# Patient Record
Sex: MALE | URBAN - METROPOLITAN AREA
[De-identification: names, ages, dates, MRNs, and addresses within clinical notes are randomized per-mention and may not be internally consistent; named-entity substitution may affect disease eponyms.]

---

## 2023-02-27 PROBLEM — Z00.00 ENCOUNTER FOR PREVENTIVE HEALTH EXAMINATION: Status: ACTIVE | Noted: 2023-02-27

## 2023-08-30 ENCOUNTER — OFFICE (OUTPATIENT)
Dept: URBAN - METROPOLITAN AREA CLINIC 97 | Facility: CLINIC | Age: 78
Setting detail: OPHTHALMOLOGY
End: 2023-08-30
Payer: MEDICARE

## 2023-08-30 DIAGNOSIS — H43.813: ICD-10-CM

## 2023-08-30 DIAGNOSIS — H01.001: ICD-10-CM

## 2023-08-30 DIAGNOSIS — H25.13: ICD-10-CM

## 2023-08-30 DIAGNOSIS — G45.9: ICD-10-CM

## 2023-08-30 DIAGNOSIS — G45.3: ICD-10-CM

## 2023-08-30 DIAGNOSIS — H01.004: ICD-10-CM

## 2023-08-30 DIAGNOSIS — H35.033: ICD-10-CM

## 2023-08-30 PROCEDURE — 99214 OFFICE O/P EST MOD 30 MIN: CPT | Performed by: OPHTHALMOLOGY

## 2023-08-30 ASSESSMENT — LID EXAM ASSESSMENTS
OS_BLEPHARITIS: LUL 2+
OD_BLEPHARITIS: RUL 2+

## 2023-08-30 ASSESSMENT — REFRACTION_MANIFEST
OS_VA1: 20/30
OD_SPHERE: -3.75
OU_VA: 20/25-2
OS_SPHERE: -4.25
OS_VA2: 20/25(J1)
OD_CYLINDER: +1.50
OS_AXIS: 180
OD_AXIS: 010
OD_VA2: 20/25(J1)
OS_CYLINDER: +1.25
OD_VA1: 20/20-

## 2023-08-30 ASSESSMENT — REFRACTION_CURRENTRX
OD_OVR_VA: 20/
OS_VPRISM_DIRECTION: SV
OD_AXIS: 007
OD_SPHERE: -3.50
OS_CYLINDER: +1.25
OD_CYLINDER: +1.50
OD_VPRISM_DIRECTION: SV
OS_SPHERE: -4.25
OS_OVR_VA: 20/
OS_AXIS: 171

## 2023-08-30 ASSESSMENT — VISUAL ACUITY
OS_BCVA: 20/20-1
OD_BCVA: 20/20

## 2023-08-30 ASSESSMENT — SPHEQUIV_DERIVED
OS_SPHEQUIV: -3.75
OD_SPHEQUIV: -2.75
OS_SPHEQUIV: -3.625
OD_SPHEQUIV: -3

## 2023-08-30 ASSESSMENT — KERATOMETRY
OS_K1POWER_DIOPTERS: 45.25
OS_K2POWER_DIOPTERS: 45.50
METHOD_AUTO_MANUAL: AUTO
OD_AXISANGLE_DEGREES: 176
OD_K1POWER_DIOPTERS: 44.25
OD_K2POWER_DIOPTERS: 45.25
OS_AXISANGLE_DEGREES: 008

## 2023-08-30 ASSESSMENT — CONFRONTATIONAL VISUAL FIELD TEST (CVF)
OD_FINDINGS: FULL
OS_FINDINGS: FULL

## 2023-08-30 ASSESSMENT — AXIALLENGTH_DERIVED
OS_AL: 24.3904
OS_AL: 24.3384
OD_AL: 24.2205
OD_AL: 24.3236

## 2023-08-30 ASSESSMENT — REFRACTION_AUTOREFRACTION
OD_AXIS: 010
OS_AXIS: 177
OS_SPHERE: -4.50
OD_CYLINDER: +1.50
OD_SPHERE: -3.50
OS_CYLINDER: +1.50

## 2023-08-30 ASSESSMENT — TONOMETRY
OD_IOP_MMHG: 15
OS_IOP_MMHG: 14

## 2024-08-26 ENCOUNTER — APPOINTMENT (OUTPATIENT)
Dept: ORTHOPEDIC SURGERY | Facility: CLINIC | Age: 79
End: 2024-08-26
Payer: MEDICARE

## 2024-08-26 ENCOUNTER — TRANSCRIPTION ENCOUNTER (OUTPATIENT)
Age: 79
End: 2024-08-26

## 2024-08-26 DIAGNOSIS — Z78.9 OTHER SPECIFIED HEALTH STATUS: ICD-10-CM

## 2024-08-26 PROCEDURE — 73030 X-RAY EXAM OF SHOULDER: CPT | Mod: RT

## 2024-08-26 PROCEDURE — 99204 OFFICE O/P NEW MOD 45 MIN: CPT

## 2024-08-26 RX ORDER — AMLODIPINE BESYLATE 5 MG/1
TABLET ORAL
Refills: 0 | Status: ACTIVE | COMMUNITY

## 2024-08-26 RX ORDER — LOSARTAN POTASSIUM 100 MG/1
TABLET, FILM COATED ORAL
Refills: 0 | Status: ACTIVE | COMMUNITY

## 2024-08-28 NOTE — PHYSICAL EXAM
[Normal Coordination] : normal coordination [Normal Sensation] : normal sensation [Normal Mood and Affect] : normal mood and affect [Oriented] : oriented [Able to Communicate] : able to communicate [Well Developed] : well developed [Well Nourished] : well nourished [NL (0-70)] : full internal rotation 0-70 degrees [NL (0-90)] : full external rotation 0-90 degrees [4 ___] : forward flexion 4[unfilled]/5 [4___] : abduction 4[unfilled]/5 [5___] : internal rotation 5[unfilled]/5 [Right] : right shoulder [] : motor and sensory intact distally [There are no fractures, subluxations or dislocations. No significant abnormalities are seen] : There are no fractures, subluxations or dislocations. No significant abnormalities are seen [[id number]] : active forward flexion 125 degrees [de-identified] : active abduction 125 degrees

## 2024-08-28 NOTE — DISCUSSION/SUMMARY
[de-identified] : We discussed MRI as patient is concerned for tear. We did discuss that likelihood is more bursitis/impingement due to more acute nature without injury. We discussed formal PT, a home exercise program, ice therapy and the role of NSAIDS for the pain. These modalities will improve the patient's functionality in their activities of daily life.  Risks, benefits and contraindications were discussed.  The patient will follow up after MRI or sooner as needed.

## 2024-08-28 NOTE — PHYSICAL EXAM
[Normal Coordination] : normal coordination [Normal Sensation] : normal sensation [Normal Mood and Affect] : normal mood and affect [Oriented] : oriented [Able to Communicate] : able to communicate [Well Developed] : well developed [Well Nourished] : well nourished [NL (0-70)] : full internal rotation 0-70 degrees [NL (0-90)] : full external rotation 0-90 degrees [4 ___] : forward flexion 4[unfilled]/5 [4___] : abduction 4[unfilled]/5 [5___] : internal rotation 5[unfilled]/5 [] : motor and sensory intact distally [Right] : right shoulder [There are no fractures, subluxations or dislocations. No significant abnormalities are seen] : There are no fractures, subluxations or dislocations. No significant abnormalities are seen [[id number]] : active forward flexion 125 degrees [de-identified] : active abduction 125 degrees

## 2024-08-28 NOTE — DISCUSSION/SUMMARY
[de-identified] : We discussed MRI as patient is concerned for tear. We did discuss that likelihood is more bursitis/impingement due to more acute nature without injury. We discussed formal PT, a home exercise program, ice therapy and the role of NSAIDS for the pain. These modalities will improve the patient's functionality in their activities of daily life.  Risks, benefits and contraindications were discussed.  The patient will follow up after MRI or sooner as needed.

## 2024-08-29 ENCOUNTER — APPOINTMENT (OUTPATIENT)
Dept: MRI IMAGING | Facility: CLINIC | Age: 79
End: 2024-08-29

## 2024-08-29 ENCOUNTER — RESULT REVIEW (OUTPATIENT)
Age: 79
End: 2024-08-29

## 2024-08-29 PROCEDURE — 73221 MRI JOINT UPR EXTREM W/O DYE: CPT | Mod: RT,MH

## 2024-08-29 PROCEDURE — 72148 MRI LUMBAR SPINE W/O DYE: CPT | Mod: MH

## 2024-09-05 ENCOUNTER — APPOINTMENT (OUTPATIENT)
Dept: ORTHOPEDIC SURGERY | Facility: CLINIC | Age: 79
End: 2024-09-05
Payer: MEDICARE

## 2024-09-05 DIAGNOSIS — M75.51 BURSITIS OF RIGHT SHOULDER: ICD-10-CM

## 2024-09-05 DIAGNOSIS — M75.41 IMPINGEMENT SYNDROME OF RIGHT SHOULDER: ICD-10-CM

## 2024-09-05 PROCEDURE — 99214 OFFICE O/P EST MOD 30 MIN: CPT

## 2024-09-05 NOTE — PHYSICAL EXAM
[Normal Coordination] : normal coordination [Normal Sensation] : normal sensation [Normal Mood and Affect] : normal mood and affect [Oriented] : oriented [Able to Communicate] : able to communicate [Well Developed] : well developed [Well Nourished] : well nourished [NL (0-70)] : full internal rotation 0-70 degrees [NL (0-90)] : full external rotation 0-90 degrees [4 ___] : forward flexion 4[unfilled]/5 [4___] : abduction 4[unfilled]/5 [5___] : internal rotation 5[unfilled]/5 [Right] : right shoulder [There are no fractures, subluxations or dislocations. No significant abnormalities are seen] : There are no fractures, subluxations or dislocations. No significant abnormalities are seen [] : negative Asa [[email or web address]] : active forward flexion 125 degrees [de-identified] : active abduction 125 degrees

## 2024-09-05 NOTE — DISCUSSION/SUMMARY
[de-identified] : We discussed formal PT, a home exercise program, ice therapy and the role of NSAIDS for the pain. These modalities will improve the patient's functionality in their activities of daily life.  Risks, benefits and contraindications were discussed.  The patient will follow up in 6 weeks or sooner as needed.

## 2024-09-05 NOTE — DISCUSSION/SUMMARY
[de-identified] : We discussed formal PT, a home exercise program, ice therapy and the role of NSAIDS for the pain. These modalities will improve the patient's functionality in their activities of daily life.  Risks, benefits and contraindications were discussed.  The patient will follow up in 6 weeks or sooner as needed.

## 2024-09-05 NOTE — DATA REVIEWED
[MRI] : MRI [Right] : of the right [Shoulder] : shoulder [Report was reviewed and noted in the chart] : The report was reviewed and noted in the chart [I reviewed the films/CD] : I reviewed the films/CD [FreeTextEntry1] : 1.  Low-grade bursal and interstitial tearing of the anterior fibers of the supraspinatus with a background of mild to moderate tendinosis. Mild subscapularis tendinosis. 2.  Moderate intra-articular tendinosis of the long head of the biceps tendon with moderate grade insertional tearing. 3.  Mild acromioclavicular joint osteoarthritis. Small subacromial spur. 4.  Mild subacromial/subdeltoid bursitis.

## 2024-09-05 NOTE — PHYSICAL EXAM
[Normal Coordination] : normal coordination [Normal Sensation] : normal sensation [Normal Mood and Affect] : normal mood and affect [Oriented] : oriented [Able to Communicate] : able to communicate [Well Developed] : well developed [Well Nourished] : well nourished [NL (0-70)] : full internal rotation 0-70 degrees [NL (0-90)] : full external rotation 0-90 degrees [4 ___] : forward flexion 4[unfilled]/5 [4___] : abduction 4[unfilled]/5 [5___] : internal rotation 5[unfilled]/5 [Right] : right shoulder [There are no fractures, subluxations or dislocations. No significant abnormalities are seen] : There are no fractures, subluxations or dislocations. No significant abnormalities are seen [] : negative Asa [[email or web address]] : active forward flexion 125 degrees [de-identified] : active abduction 125 degrees

## 2024-09-20 ENCOUNTER — APPOINTMENT (OUTPATIENT)
Dept: ORTHOPEDIC SURGERY | Facility: CLINIC | Age: 79
End: 2024-09-20

## 2024-10-16 ENCOUNTER — APPOINTMENT (OUTPATIENT)
Dept: ORTHOPEDIC SURGERY | Facility: CLINIC | Age: 79
End: 2024-10-16
Payer: MEDICARE

## 2024-10-16 DIAGNOSIS — M75.41 IMPINGEMENT SYNDROME OF RIGHT SHOULDER: ICD-10-CM

## 2024-10-16 PROCEDURE — 99213 OFFICE O/P EST LOW 20 MIN: CPT
